# Patient Record
Sex: FEMALE | Race: WHITE | URBAN - METROPOLITAN AREA
[De-identification: names, ages, dates, MRNs, and addresses within clinical notes are randomized per-mention and may not be internally consistent; named-entity substitution may affect disease eponyms.]

---

## 2021-06-22 ENCOUNTER — OFFICE VISIT (OUTPATIENT)
Dept: FAMILY MEDICINE | Facility: CLINIC | Age: 36
End: 2021-06-22
Payer: COMMERCIAL

## 2021-06-22 VITALS
WEIGHT: 153 LBS | RESPIRATION RATE: 18 BRPM | HEIGHT: 63 IN | DIASTOLIC BLOOD PRESSURE: 82 MMHG | TEMPERATURE: 98.7 F | BODY MASS INDEX: 27.11 KG/M2 | SYSTOLIC BLOOD PRESSURE: 120 MMHG | HEART RATE: 70 BPM

## 2021-06-22 DIAGNOSIS — K21.9 GASTROESOPHAGEAL REFLUX DISEASE, UNSPECIFIED WHETHER ESOPHAGITIS PRESENT: ICD-10-CM

## 2021-06-22 DIAGNOSIS — Z72.0 TOBACCO ABUSE: ICD-10-CM

## 2021-06-22 DIAGNOSIS — R49.0 VOICE HOARSENESS: Primary | ICD-10-CM

## 2021-06-22 PROCEDURE — 99203 OFFICE O/P NEW LOW 30 MIN: CPT | Performed by: FAMILY MEDICINE

## 2021-06-22 RX ORDER — PANTOPRAZOLE SODIUM 40 MG/1
40 TABLET, DELAYED RELEASE ORAL DAILY
Qty: 30 TABLET | Refills: 1 | Status: SHIPPED | OUTPATIENT
Start: 2021-06-22 | End: 2021-08-25

## 2021-06-22 ASSESSMENT — MIFFLIN-ST. JEOR: SCORE: 1353.13

## 2021-06-22 NOTE — PATIENT INSTRUCTIONS
Patient Education   Resources to Help You Quit Smoking  If you have quit smoking or are thinking about quitting, congratulations! It can be hard to quit smoking, but the benefits are well worth it. To help you quit and stay smoke-free, there are many resources that can help.  Your health plan  If you have health insurance, call them for more details about their phone coaching programs.    [facility]:  7-858-630-BLUE ([phone])    [facility]:  6-977-697-QUIT ([phone])    [facility]:  9-906-412-BLUE ([phone])    [facility]:  [phone]    [facility]:  [phone]    [facility] members:  [phone]    [facility]:   [phone]    [facility]:  [phone]    [facility]:  [phone]  Other resources  American Cancer Society: 1-468.778.6893  The American Cancer Society can help you find local resources to quit smoking.  QUITPLAN: 1-997-745-PLAN (1-937.645.8643)  Offers a telephone helpline, gum, patches and lozenges. These services are free for the uninsured and those without coverage. The online program is free to everyone at www.quitplan.com.  American Lung Association: 6-879-LUNG-USA (1-872.340.3237)  Provides a lung helpline as well as an online program, self-help book and group clinic support for quitting smoking. www.lung.org/stop-smoking  National Cancer [place]:  8-088-587-QUIT (1-952.536.4325)  Offers a telephone hotline, online text chat and a website with tools, information and support for smokers who want to quit. www.smokefree.gov  Medication Therapy Management (MTM):  [phone] (toll free)  [email or web address]  This is a clinic program to help you quit smoking. It offers one-on-one sessions with a pharmacist. Call or email to find out if your insurance covers MTM and to schedule an appointment at all locations.  For informational purposes only.  Not to replace the advice of your health care provider. Copyright   2013 [facility]. All rights reserved. Clinically reviewed by Mira Lawson MD, [facility] Lung Cancer Screening Program. Care[employer] [id number] - Rev 06/19.       Patient Education     GERD (Adult)    The esophagus is a tube that carries food from the mouth to the stomach. A valve (the LES, lower esophageal sphincter) at the lower end of the esophagus prevents stomach acid from flowing upward. When this valve doesn't work properly, stomach contents may repeatedly flow back up (reflux) into the esophagus. This is called gastroesophageal reflux disease (GERD). GERD can irritate the esophagus. It can cause problems with pain, swallowing or breathing. In severe cases, GERD can cause recurrent pneumonia (from aspiration or breathing in particles) or other serious problems.  Symptoms of reflux include burning, pressure or sharp pain in the upper abdomen or mid to lower chest. The pain can spread to the neck, back, or shoulder. There may be belching, an acid taste in the back of the throat, chronic cough, or sore throat, or hoarseness. GERD symptoms often occur during the day after a big meal. They can also occur at night when lying down.   Home care  Lifestyle changes can help reduce symptoms. If needed, your healthcare provider may prescribe medicines. Symptoms often improve with treatment, but if treatment is stopped, the symptoms often return after a few months. So most persons with GERD will need to continue treatment or get treatment on and off.  Lifestyle changes    Limit or avoid fatty, fried, and spicy foods, as well as coffee, chocolate, mint, and foods with high acid content such as tomatoes and citrus fruit and juices (orange, grapefruit, lemon).    Don t eat large meals, especially at night. Frequent, smaller meals are best. Don't lie down right after eating. And don t eat anything 3 hours before going to  "bed.    Don't drink alcohol or smoke. As much as possible, stay away from second hand smoke.    If you are overweight, losing weight will reduce symptoms.     Don't wear tight clothing around your stomach area.    If your symptoms occur during sleep, use a foam wedge to elevate your upper body (not just your head.) Or, place 4\" blocks under the head of your bed. Or use 2 bed risers under your bedframe.  Medicines  If needed, medicines can help relieve the symptoms of GERD and prevent damage to the esophagus. Discuss a medicine plan with your healthcare provider. This may include one or more of the following medicines:    Antacids to help neutralize the normal acids in your stomach.    Acid blockers (Histamine or H2 blockers) to decrease acid production.    Acid inhibitors (proton pump inhibitors PPIs) to decrease acid production in a different way than the blockers. They may work better, but can take a little longer to take effect.  Take an antacid 30 to 60 minutes after eating and at bedtime, but not at the same time as an acid blocker.  Try not to take medicines such as ibuprofen and aspirin. If you are taking aspirin for your heart or other medical reasons, talk to your healthcare provider about stopping it.  Follow-up care  Follow up with your healthcare provider or as advised by our staff.  When to seek medical advice  Call your healthcare provider if any of the following occur:    Stomach pain gets worse or moves to the lower right abdomen (appendix area)    Chest pain appears or gets worse, or spreads to the back, neck, shoulder, or arm    An over-the-counter trial of medicine doesn't relieve your symptoms    Weight loss that can't be explained    Trouble or pain swallowing    Frequent vomiting (can t keep down liquids)    Blood in the stool or vomit (red or black in color)    Feeling weak or dizzy    Fever of 100.4 F (38 C) or higher, or as directed by your healthcare provider  Anna last reviewed this " educational content on 3/1/2018    6343-8511 [employer]. All rights reserved. This information is not intended as a substitute for professional medical care. Always follow your healthcare professional's instructions.

## 2021-06-22 NOTE — PROGRESS NOTES
"  Assessment & Plan     Voice hoarseness  Differentials discussed in detail including gastroesophageal reflux disease.  Protonix prescribed, common side effect discussed.  Stressed on smoking cessation.  ENT referral placed considering chronicity of symptoms and history of smoking.  Follow-up in 4 weeks or earlier if needed  - OTOLARYNGOLOGY REFERRAL    Tobacco abuse  Smoking about half a pack of cigarette per day, associated health hazards explained in detail, unfortunately not ready to quit currently    Gastroesophageal reflux disease, unspecified whether esophagitis present  As above, dietary counseling and written information provided  - pantoprazole (PROTONIX) 40 MG EC tablet; Take 1 tablet (40 mg) by mouth daily       Tobacco Cessation:   reports that she has been smoking cigarettes. She has been smoking about 0.50 packs per day. She has never used smokeless tobacco.  Tobacco Cessation Action Plan: Information offered: Patient not interested at this time    BMI:   Estimated body mass index is 27.1 kg/m  as calculated from the following:    Height as of this encounter: 1.6 m (5' 3\").    Weight as of this encounter: 69.4 kg (153 lb).   Weight management plan: Discussed healthy diet and exercise guidelines    Patient Instructions     Patient Education   Resources to Help You Quit Smoking  If you have quit smoking or are thinking about quitting, congratulations! It can be hard to quit smoking, but the benefits are well worth it. To help you quit and stay smoke-free, there are many resources that can help.  Your health plan  If you have health insurance, call them for more details about their phone coaching programs.    [facility]:  0-379-002-BLUE ([phone])    [facility]:  4-550-978-QUIT ([phone])    [facility]:  7-408-791-BLUE ([phone])    [facility]:  [phone]    [facility]:  [phone]    [facility] " members:  [phone]    [facility]:   [phone]    [facility]:  [phone]    [facility]:  [phone]  Other resources  American Cancer Society: 1-581.115.4555  The American Cancer Society can help you find local resources to quit smoking.  QUITPLAN: 5-837-544-PLAN (1-422.525.4602)  Offers a telephone helpline, gum, patches and lozenges. These services are free for the uninsured and those without coverage. The online program is free to everyone at www.[employer]Zuz.com.  American Lung Association: 1-800-LUNG-USA (1-704.648.3387)  Provides a lung helpline as well as an online program, self-help book and group clinic support for quitting smoking. www.lung.org/stop-smoking  National Cancer [place]:  6-122-697-QUIT (1-126.969.6370)  Offers a telephone hotline, online text chat and a website with tools, information and support for smokers who want to quit. www.smokefree.gov  Medication Therapy Management (MTM):  [phone]  [phone] (toll free)  [email or web address]  This is a clinic program to help you quit smoking. It offers one-on-one sessions with a pharmacist. Call or email to find out if your insurance covers MTM and to schedule an appointment at all locations.  For informational purposes only. Not to replace the advice of your health care provider. Copyright   2013 [facility]. All rights reserved. Clinically reviewed by Mira Lawson MD, [facility] Lung Cancer Screening Program. [employer] [id number] - Rev 06/19.       Patient Education     GERD (Adult)    The esophagus is a tube that carries food from the mouth to the stomach. A valve (the LES, lower esophageal sphincter) at the lower end of the esophagus prevents stomach acid from flowing upward. When this valve doesn't work properly, stomach contents may repeatedly flow back up (reflux) into the esophagus. This is called gastroesophageal reflux disease (GERD). GERD can  "irritate the esophagus. It can cause problems with pain, swallowing or breathing. In severe cases, GERD can cause recurrent pneumonia (from aspiration or breathing in particles) or other serious problems.  Symptoms of reflux include burning, pressure or sharp pain in the upper abdomen or mid to lower chest. The pain can spread to the neck, back, or shoulder. There may be belching, an acid taste in the back of the throat, chronic cough, or sore throat, or hoarseness. GERD symptoms often occur during the day after a big meal. They can also occur at night when lying down.   Home care  Lifestyle changes can help reduce symptoms. If needed, your healthcare provider may prescribe medicines. Symptoms often improve with treatment, but if treatment is stopped, the symptoms often return after a few months. So most persons with GERD will need to continue treatment or get treatment on and off.  Lifestyle changes    Limit or avoid fatty, fried, and spicy foods, as well as coffee, chocolate, mint, and foods with high acid content such as tomatoes and citrus fruit and juices (orange, grapefruit, lemon).    Don t eat large meals, especially at night. Frequent, smaller meals are best. Don't lie down right after eating. And don t eat anything 3 hours before going to bed.    Don't drink alcohol or smoke. As much as possible, stay away from second hand smoke.    If you are overweight, losing weight will reduce symptoms.     Don't wear tight clothing around your stomach area.    If your symptoms occur during sleep, use a foam wedge to elevate your upper body (not just your head.) Or, place 4\" blocks under the head of your bed. Or use 2 bed risers under your bedframe.  Medicines  If needed, medicines can help relieve the symptoms of GERD and prevent damage to the esophagus. Discuss a medicine plan with your healthcare provider. This may include one or more of the following medicines:    Antacids to help neutralize the normal acids in " your stomach.    Acid blockers (Histamine or H2 blockers) to decrease acid production.    Acid inhibitors (proton pump inhibitors PPIs) to decrease acid production in a different way than the blockers. They may work better, but can take a little longer to take effect.  Take an antacid 30 to 60 minutes after eating and at bedtime, but not at the same time as an acid blocker.  Try not to take medicines such as ibuprofen and aspirin. If you are taking aspirin for your heart or other medical reasons, talk to your healthcare provider about stopping it.  Follow-up care  Follow up with your healthcare provider or as advised by our staff.  When to seek medical advice  Call your healthcare provider if any of the following occur:    Stomach pain gets worse or moves to the lower right abdomen (appendix area)    Chest pain appears or gets worse, or spreads to the back, neck, shoulder, or arm    An over-the-counter trial of medicine doesn't relieve your symptoms    Weight loss that can't be explained    Trouble or pain swallowing    Frequent vomiting (can t keep down liquids)    Blood in the stool or vomit (red or black in color)    Feeling weak or dizzy    Fever of 100.4 F (38 C) or higher, or as directed by your healthcare provider  [employer] last reviewed this educational content on 3/1/2018    5656-6591 [employer]. All rights reserved. This information is not intended as a substitute for professional medical care. Always follow your healthcare professional's instructions.               Return in about 4 weeks (around 7/20/2021).    Cb Dougherty MD  [facility]    Randall Zamora is a 36 year old who presents for the following health issues     HPI     Concern - Hoarse Voice   Onset: Really long time- over a year off and on- was told to get checked for polyps   Description: Hoarse voice- starting to be more frequent   Intensity: moderate  Progression of Symptoms:   "worsening  Accompanying Signs & Symptoms: pain at times- if she raises her voice any then she will get spots in her throat that are sore.  Has bad acid reflux, if she coughs she will throw up. Is a current smoker- about half a pack a day   Previous history of similar problem: none-  Mom had throat cancer- not sure what type though  Therapies tried and outcome:  none       Review of Systems   Constitutional, HEENT, cardiovascular, pulmonary, gi and gu systems are negative, except as otherwise noted.      Objective    /82 (Cuff Size: Adult Regular)   Pulse 70   Temp 98.7  F (37.1  C) (Tympanic)   Resp 18   Ht 1.6 m (5' 3\")   Wt 69.4 kg (153 lb)   LMP  (LMP Unknown)   Breastfeeding No   BMI 27.10 kg/m    Body mass index is 27.1 kg/m .  Physical Exam   GENERAL: alert and no distress  EYES: Eyes grossly normal to inspection, PERRL and conjunctivae and sclerae normal  HENT: ear canals and TM's normal, nose and mouth without ulcers or lesions  NECK: no adenopathy, no asymmetry, masses, or scars and thyroid normal to palpation  RESP: lungs clear to auscultation - no rales, rhonchi or wheezes  CV: regular rate and rhythm, normal S1 S2, no S3 or S4, no murmur, click or rub, no peripheral edema and peripheral pulses strong  ABDOMEN: soft, nontender, no hepatosplenomegaly, no masses and bowel sounds normal  MS: no gross musculoskeletal defects noted, no edema  SKIN: no suspicious lesions or rashes  NEURO: Normal strength and tone, mentation intact and speech normal        "

## 2021-06-22 NOTE — NURSING NOTE
"Chief Complaint   Patient presents with     Hoarse     /82 (Cuff Size: Adult Regular)   Pulse 70   Temp 98.7  F (37.1  C) (Tympanic)   Resp 18   Ht 1.6 m (5' 3\")   Wt 69.4 kg (153 lb)   LMP  (LMP Unknown)   Breastfeeding No   BMI 27.10 kg/m   Estimated body mass index is 27.1 kg/m  as calculated from the following:    Height as of this encounter: 1.6 m (5' 3\").    Weight as of this encounter: 69.4 kg (153 lb).  Patient presents to the clinic using No DME      Health Maintenance that is potentially due pending provider review:    There are no preventive care reminders to display for this patient.             "

## 2021-07-09 NOTE — PROGRESS NOTES
Chief Complaint   Patient presents with     Throat Problem     c/o hoarseness and c/o pain in throat for over 2 years at intervals- daily now for last month     History of Present Illness  Sera Oshea is a 36 year old female who presents today for evaluation.  I am seeing this patient in consultation for voice hoarseness at the request of the provider Dr. Dougherty.  The patient reports a history of hoarseness for the past several years.  She feels like is been getting worse over the past few months.  She almost has daily symptoms.  She does have some intermittent trouble swallowing but no significant odynophagia.  No persistent pharyngodynia, otalgia, hemoptysis.  She sometimes will feel like she has some neck fullness or discomfort but no persistent lymphadenopathy or masses.  No unintentional weight loss, she lost a bit of weight recently on the keto diet.  She is a current smoker, roughly 1/2 pack/day.  The patient has a 15-20 pack year smoking history. The patient notes a significant history of symptomatic reflux several times a week.  She was recently started on pantoprazole with good benefit.  She usually takes this in the morning, she does not oftentimes have breakfast.  She usually has dinner between 7 and 9 PM and goes to bed between 9 and 11 PM.  He does not use any inhalers.     Past Medical History  There is no problem list on file for this patient.    Current Medications     Current Outpatient Medications:      nicotine (NICODERM CQ) 14 MG/24HR 24 hr patch, Place 1 patch onto the skin every 24 hours, Disp: 30 patch, Rfl: 1     nicotine (NICOTROL) 10 MG inhaler, Use 1 cartridge as needed for urge to smoke by puffing over course of 20min.  Use 6-16 cart/day; reduce number of cart/day over 6-12 weeks., Disp: 168 each, Rfl: 1     pantoprazole (PROTONIX) 40 MG EC tablet, Take 1 tablet (40 mg) by mouth daily, Disp: 30 tablet, Rfl: 1    Allergies  No Known Allergies    Social History   Social History      Socioeconomic History     Marital status:      Spouse name: Not on file     Number of children: Not on file     Years of education: Not on file     Highest education level: Not on file   Occupational History     Not on file   Social Needs     Financial resource strain: Not on file     Food insecurity     Worry: Not on file     Inability: Not on file     Transportation needs     Medical: Not on file     Non-medical: Not on file   Tobacco Use     Smoking status: Current Every Day Smoker     Packs/day: 0.50     Types: Cigarettes     Smokeless tobacco: Never Used   Substance and Sexual Activity     Alcohol use: Yes     Drug use: No     Sexual activity: Not on file   Lifestyle     Physical activity     Days per week: Not on file     Minutes per session: Not on file     Stress: Not on file   Relationships     Social connections     Talks on phone: Not on file     Gets together: Not on file     Attends Yazidism service: Not on file     Active member of club or organization: Not on file     Attends meetings of clubs or organizations: Not on file     Relationship status: Not on file     Intimate partner violence     Fear of current or ex partner: Not on file     Emotionally abused: Not on file     Physically abused: Not on file     Forced sexual activity: Not on file   Other Topics Concern     Not on file   Social History Narrative     Not on file       Family History  Family History   Problem Relation Age of Onset     Emphysema Mother      Throat cancer Mother 63       Review of Systems  As per HPI and PMHx, otherwise 10+ comprehensive system review is negative.    Physical Exam  /75 (BP Location: Right arm, Patient Position: Chair, Cuff Size: Adult Regular)   Pulse 90   Temp 98.8  F (37.1  C) (Tympanic)   Wt 69.4 kg (153 lb)   LMP  (LMP Unknown)   BMI 27.10 kg/m    GENERAL: Patient is a pleasant, cooperative 36 year old female in no acute distress.  HEAD: Normocephalic, atraumatic.  Hair and scalp  are normal.  EYES: Pupils are equal, round, reactive to light and accommodation.  Extraocular movements are intact.  The sclera nonicteric without injection.  The extraocular structures are normal.  EARS: Normal shape and symmetry.  No tenderness when palpating the mastoid or tragal areas bilaterally.  Otoscopic exam reveals a minimal amount of cerumen bilaterally.  The bilateral tympanic membranes are round, intact without evidence of effusion, good landmarks.  No retraction, granulation, or drainage.  NOSE: Nares are patent.  Nasal mucosa is pink and moist. Negative anterior rhinoscpy.  ORAL CAVITY: Dentition is in good repair.  Mucous membranes are moist.  Tongue is mobile, protrudes to the midline.  Palate elevates symmetrically.  Tonsils are 1+, symmetric.  No erythema or exudate.  No oral cavity or oropharyngeal masses, lesions, ulcerations, leukoplakia.  NECK: Supple, trachea is midline.  There no palpable cervical lymphadenopathy or masses bilaterally.  Palpation of the bilateral parotid and submandibular areas reveal no masses.  No thyromegaly.    NEUROLOGIC: Cranial nerves II through XII are grossly intact.  The patients voice is hoarse and raspy.  Patient is House-Brackmann I/VI bilaterally.  CARDIOVASCULAR: Extremities are warm and well-perfused.  No significant peripheral edema.  RESPIRATORY: Patient has nonlabored breathing without cough, wheeze, stridor.  PSYCHIATRIC: Patient is alert and oriented.  Mood and affect appear normal.  SKIN: Warm and dry.  No scalp, face, or neck lesions noted.    Procedure: Flexible Laryngoscopy  Indication: Dysphonia    To best visualize the upper airway anatomy and due to the chief complaint and HPI, I proceeded with flexible fiberoptic laryngoscopy examination.  The bilateral nasal cavities were anesthetized and decongested with a mixture of lidocaine and neosynephrine.  The bilateral nasal cavities were examined using a flexible fiberoptic laryngoscope.  There were no  nasal cavity masses, polyps, or mucopurulence bilaterally.  The nasopharynx had a normal appearance with normal Eustachian tube openings and fossa of Rosenmuller bilaterally. Minimal adenoid tissue.  The base of tongue, vallecula, epiglottis, aryepiglottic folds, arytenoids, and piriform sinuses were without mass or lesion.  The bilateral true vocal folds were symmetrically mobile without masses.  Patient does have appearance of small bilateral vocal fold nodules that lead to an anterior and posterior gap during phonation.  She also has some supraglottic squeeze/hyperfunction with phonation.  The visualized portions of the infraglottic and subglottic airway are unremarkable.  The scope was removed.  The patient tolerated the procedure well.            Assessment and Plan    ICD-10-CM    1. Muscle tension dysphonia  R49.0 LARYNGOSCOPY FLEX FIBEROPTIC, DIAGNOSTIC     Speech Therapy Referral     nicotine (NICODERM CQ) 14 MG/24HR 24 hr patch     nicotine (NICOTROL) 10 MG inhaler     Adult Gastro Ref - Procedure Only   2. Vocal cord nodules  J38.2 LARYNGOSCOPY FLEX FIBEROPTIC, DIAGNOSTIC     Speech Therapy Referral     nicotine (NICODERM CQ) 14 MG/24HR 24 hr patch     nicotine (NICOTROL) 10 MG inhaler     Adult Gastro Ref - Procedure Only   3. Laryngopharyngeal reflux  K21.9 nicotine (NICODERM CQ) 14 MG/24HR 24 hr patch     nicotine (NICOTROL) 10 MG inhaler     Adult Gastro Ref - Procedure Only   4. Tobacco dependence syndrome  F17.200 LARYNGOSCOPY FLEX FIBEROPTIC, DIAGNOSTIC     Speech Therapy Referral     nicotine (NICODERM CQ) 14 MG/24HR 24 hr patch     nicotine (NICOTROL) 10 MG inhaler     Adult Gastro Ref - Procedure Only   5. Encounter for tobacco use cessation counseling  Z71.6 LARYNGOSCOPY FLEX FIBEROPTIC, DIAGNOSTIC     Speech Therapy Referral     nicotine (NICODERM CQ) 14 MG/24HR 24 hr patch     nicotine (NICOTROL) 10 MG inhaler     Adult Gastro Ref - Procedure Only      It was my pleasure seeing Sera SEWELL  Dorie today in clinic.  The patient presented today with intermittent dysphonia that is been more persistent over the past several months.  Her endoscopic exam is consistent with some muscle tension dysphonia and bilateral anterior true vocal fold nodules.  She has no significant signs of  infection, inflammation, or neoplasm on exam to explain the symptoms.     Given the patient's significant history of gastroesophageal reflux, I think there is also a large component of laryngopharyngeal reflux.  Is been started on a PPI with good control of her reflux symptoms.  She does not take it every day as she is not always symptomatic.  We discussed taking the pantoprazole once daily 20-30 minutes prior to a meal.  We discussed not eating or drink anything significant 2 to 3 hours prior to bedtime.  I do think the patient would benefit from tobacco cessation.  I provided her with a prescription for nicotine patches and the Nicotrol inhaler.  The patient also would benefit from speech therapy evaluation for treatment of her muscle tension dysphonia and good vocal hygiene and voice use to treat her vocal nodules.  I placed a referral to speech therapy.  I also placed a referral for upper endoscopy to make sure she has no significant signs of reflux and/or hiatal hernia.  I would like to see the patient back in 6 weeks for follow-up.     Jaden Atkins MD  Department of Otolarygology-Head and Neck Surgery  [facility]

## 2021-07-12 ENCOUNTER — OFFICE VISIT (OUTPATIENT)
Dept: OTOLARYNGOLOGY | Facility: CLINIC | Age: 36
End: 2021-07-12
Attending: FAMILY MEDICINE
Payer: COMMERCIAL

## 2021-07-12 VITALS
DIASTOLIC BLOOD PRESSURE: 75 MMHG | HEART RATE: 90 BPM | BODY MASS INDEX: 27.1 KG/M2 | SYSTOLIC BLOOD PRESSURE: 120 MMHG | TEMPERATURE: 98.8 F | WEIGHT: 153 LBS

## 2021-07-12 DIAGNOSIS — F17.200 TOBACCO DEPENDENCE SYNDROME: ICD-10-CM

## 2021-07-12 DIAGNOSIS — K21.9 LARYNGOPHARYNGEAL REFLUX: ICD-10-CM

## 2021-07-12 DIAGNOSIS — Z71.6 ENCOUNTER FOR TOBACCO USE CESSATION COUNSELING: ICD-10-CM

## 2021-07-12 DIAGNOSIS — R49.0 MUSCLE TENSION DYSPHONIA: Primary | ICD-10-CM

## 2021-07-12 DIAGNOSIS — J38.2 VOCAL CORD NODULES: ICD-10-CM

## 2021-07-12 PROCEDURE — 31575 DIAGNOSTIC LARYNGOSCOPY: CPT | Performed by: OTOLARYNGOLOGY

## 2021-07-12 PROCEDURE — 99203 OFFICE O/P NEW LOW 30 MIN: CPT | Mod: 25 | Performed by: OTOLARYNGOLOGY

## 2021-07-12 RX ORDER — NICOTINE 21 MG/24HR
1 PATCH, TRANSDERMAL 24 HOURS TRANSDERMAL EVERY 24 HOURS
Qty: 30 PATCH | Refills: 1 | Status: SHIPPED | OUTPATIENT
Start: 2021-07-12 | End: 2021-08-25

## 2021-07-12 ASSESSMENT — PAIN SCALES - GENERAL: PAINLEVEL: MILD PAIN (2)

## 2021-07-12 NOTE — LETTER
7/12/2021         RE: Sera Oshea  [address] [address] [address]        Dear Colleague,    Thank you for referring your patient, Sera Oshea, to the [facility]. Please see a copy of my visit note below.    Chief Complaint   Patient presents with     Throat Problem     c/o hoarseness and c/o pain in throat for over 2 years at intervals- daily now for last month     History of Present Illness  Sera Oshea is a 36 year old female who presents today for evaluation.  I am seeing this patient in consultation for voice hoarseness at the request of the provider Dr. Dougherty.  The patient reports a history of hoarseness for the past several years.  She feels like is been getting worse over the past few months.  She almost has daily symptoms.  She does have some intermittent trouble swallowing but no significant odynophagia.  No persistent pharyngodynia, otalgia, hemoptysis.  She sometimes will feel like she has some neck fullness or discomfort but no persistent lymphadenopathy or masses.  No unintentional weight loss, she lost a bit of weight recently on the keto diet.  She is a current smoker, roughly 1/2 pack/day.  The patient has a 15-20 pack year smoking history. The patient notes a significant history of symptomatic reflux several times a week.  She was recently started on pantoprazole with good benefit.  She usually takes this in the morning, she does not oftentimes have breakfast.  She usually has dinner between 7 and 9 PM and goes to bed between 9 and 11 PM.  He does not use any inhalers.     Past Medical History  There is no problem list on file for this patient.    Current Medications     Current Outpatient Medications:      nicotine (NICODERM CQ) 14 MG/24HR 24 hr patch, Place 1 patch onto the skin every 24 hours, Disp: 30 patch, Rfl: 1     nicotine (NICOTROL) 10 MG inhaler, Use 1 cartridge as needed for urge to smoke by puffing over course of 20min.  Use 6-16  cart/day; reduce number of cart/day over 6-12 weeks., Disp: 168 each, Rfl: 1     pantoprazole (PROTONIX) 40 MG EC tablet, Take 1 tablet (40 mg) by mouth daily, Disp: 30 tablet, Rfl: 1    Allergies  No Known Allergies    Social History   Social History     Socioeconomic History     Marital status:      Spouse name: Not on file     Number of children: Not on file     Years of education: Not on file     Highest education level: Not on file   Occupational History     Not on file   Social Needs     Financial resource strain: Not on file     Food insecurity     Worry: Not on file     Inability: Not on file     Transportation needs     Medical: Not on file     Non-medical: Not on file   Tobacco Use     Smoking status: Current Every Day Smoker     Packs/day: 0.50     Types: Cigarettes     Smokeless tobacco: Never Used   Substance and Sexual Activity     Alcohol use: Yes     Drug use: No     Sexual activity: Not on file   Lifestyle     Physical activity     Days per week: Not on file     Minutes per session: Not on file     Stress: Not on file   Relationships     Social connections     Talks on phone: Not on file     Gets together: Not on file     Attends Mosque service: Not on file     Active member of club or organization: Not on file     Attends meetings of clubs or organizations: Not on file     Relationship status: Not on file     Intimate partner violence     Fear of current or ex partner: Not on file     Emotionally abused: Not on file     Physically abused: Not on file     Forced sexual activity: Not on file   Other Topics Concern     Not on file   Social History Narrative     Not on file       Family History  Family History   Problem Relation Age of Onset     Emphysema Mother      Throat cancer Mother 63       Review of Systems  As per HPI and PMHx, otherwise 10+ comprehensive system review is negative.    Physical Exam  /75 (BP Location: Right arm, Patient Position: Chair, Cuff Size: Adult Regular)    Pulse 90   Temp 98.8  F (37.1  C) (Tympanic)   Wt 69.4 kg (153 lb)   LMP  (LMP Unknown)   BMI 27.10 kg/m    GENERAL: Patient is a pleasant, cooperative 36 year old female in no acute distress.  HEAD: Normocephalic, atraumatic.  Hair and scalp are normal.  EYES: Pupils are equal, round, reactive to light and accommodation.  Extraocular movements are intact.  The sclera nonicteric without injection.  The extraocular structures are normal.  EARS: Normal shape and symmetry.  No tenderness when palpating the mastoid or tragal areas bilaterally.  Otoscopic exam reveals a minimal amount of cerumen bilaterally.  The bilateral tympanic membranes are round, intact without evidence of effusion, good landmarks.  No retraction, granulation, or drainage.  NOSE: Nares are patent.  Nasal mucosa is pink and moist. Negative anterior rhinoscpy.  ORAL CAVITY: Dentition is in good repair.  Mucous membranes are moist.  Tongue is mobile, protrudes to the midline.  Palate elevates symmetrically.  Tonsils are 1+, symmetric.  No erythema or exudate.  No oral cavity or oropharyngeal masses, lesions, ulcerations, leukoplakia.  NECK: Supple, trachea is midline.  There no palpable cervical lymphadenopathy or masses bilaterally.  Palpation of the bilateral parotid and submandibular areas reveal no masses.  No thyromegaly.    NEUROLOGIC: Cranial nerves II through XII are grossly intact.  The patients voice is hoarse and raspy.  Patient is House-Brackmann I/VI bilaterally.  CARDIOVASCULAR: Extremities are warm and well-perfused.  No significant peripheral edema.  RESPIRATORY: Patient has nonlabored breathing without cough, wheeze, stridor.  PSYCHIATRIC: Patient is alert and oriented.  Mood and affect appear normal.  SKIN: Warm and dry.  No scalp, face, or neck lesions noted.    Procedure: Flexible Laryngoscopy  Indication: Dysphonia    To best visualize the upper airway anatomy and due to the chief complaint and HPI, I proceeded with flexible  fiberoptic laryngoscopy examination.  The bilateral nasal cavities were anesthetized and decongested with a mixture of lidocaine and neosynephrine.  The bilateral nasal cavities were examined using a flexible fiberoptic laryngoscope.  There were no nasal cavity masses, polyps, or mucopurulence bilaterally.  The nasopharynx had a normal appearance with normal Eustachian tube openings and fossa of Rosenmuller bilaterally. Minimal adenoid tissue.  The base of tongue, vallecula, epiglottis, aryepiglottic folds, arytenoids, and piriform sinuses were without mass or lesion.  The bilateral true vocal folds were symmetrically mobile without masses.  Patient does have appearance of small bilateral vocal fold nodules that lead to an anterior and posterior gap during phonation.  She also has some supraglottic squeeze/hyperfunction with phonation.  The visualized portions of the infraglottic and subglottic airway are unremarkable.  The scope was removed.  The patient tolerated the procedure well.            Assessment and Plan    ICD-10-CM    1. Muscle tension dysphonia  R49.0 LARYNGOSCOPY FLEX FIBEROPTIC, DIAGNOSTIC     Speech Therapy Referral     nicotine (NICODERM CQ) 14 MG/24HR 24 hr patch     nicotine (NICOTROL) 10 MG inhaler     Adult Gastro Ref - Procedure Only   2. Vocal cord nodules  J38.2 LARYNGOSCOPY FLEX FIBEROPTIC, DIAGNOSTIC     Speech Therapy Referral     nicotine (NICODERM CQ) 14 MG/24HR 24 hr patch     nicotine (NICOTROL) 10 MG inhaler     Adult Gastro Ref - Procedure Only   3. Laryngopharyngeal reflux  K21.9 nicotine (NICODERM CQ) 14 MG/24HR 24 hr patch     nicotine (NICOTROL) 10 MG inhaler     Adult Gastro Ref - Procedure Only   4. Tobacco dependence syndrome  F17.200 LARYNGOSCOPY FLEX FIBEROPTIC, DIAGNOSTIC     Speech Therapy Referral     nicotine (NICODERM CQ) 14 MG/24HR 24 hr patch     nicotine (NICOTROL) 10 MG inhaler     Adult Gastro Ref - Procedure Only   5. Encounter for tobacco use cessation  counseling  Z71.6 LARYNGOSCOPY FLEX FIBEROPTIC, DIAGNOSTIC     Speech Therapy Referral     nicotine (NICODERM CQ) 14 MG/24HR 24 hr patch     nicotine (NICOTROL) 10 MG inhaler     Adult Gastro Ref - Procedure Only      It was my pleasure seeing Sera Oshea today in clinic.  The patient presented today with intermittent dysphonia that is been more persistent over the past several months.  Her endoscopic exam is consistent with some muscle tension dysphonia and bilateral anterior true vocal fold nodules.  She has no significant signs of  infection, inflammation, or neoplasm on exam to explain the symptoms.     Given the patient's significant history of gastroesophageal reflux, I think there is also a large component of laryngopharyngeal reflux.  Is been started on a PPI with good control of her reflux symptoms.  She does not take it every day as she is not always symptomatic.  We discussed taking the pantoprazole once daily 20-30 minutes prior to a meal.  We discussed not eating or drink anything significant 2 to 3 hours prior to bedtime.  I do think the patient would benefit from tobacco cessation.  I provided her with a prescription for nicotine patches and the Nicotrol inhaler.  The patient also would benefit from speech therapy evaluation for treatment of her muscle tension dysphonia and good vocal hygiene and voice use to treat her vocal nodules.  I placed a referral to speech therapy.  I also placed a referral for upper endoscopy to make sure she has no significant signs of reflux and/or hiatal hernia.  I would like to see the patient back in 6 weeks for follow-up.     Jaden Atkins MD  Department of Otolarygology-Head and Neck Surgery  [facility]         Again, thank you for allowing me to participate in the care of your patient.        Sincerely,        Jaden Atkins MD

## 2021-07-12 NOTE — NURSING NOTE
"Initial /75 (BP Location: Right arm, Patient Position: Chair, Cuff Size: Adult Regular)   Pulse 90   Temp 98.8  F (37.1  C) (Tympanic)   Wt 69.4 kg (153 lb)   LMP  (LMP Unknown)   BMI 27.10 kg/m   Estimated body mass index is 27.1 kg/m  as calculated from the following:    Height as of 6/22/21: 1.6 m (5' 3\").    Weight as of this encounter: 69.4 kg (153 lb). .    Blank Damon LPN    "

## 2021-07-13 DIAGNOSIS — Z11.59 ENCOUNTER FOR SCREENING FOR OTHER VIRAL DISEASES: ICD-10-CM

## 2021-07-14 ENCOUNTER — HOSPITAL ENCOUNTER (OUTPATIENT)
Dept: SPEECH THERAPY | Facility: CLINIC | Age: 36
Setting detail: THERAPIES SERIES
End: 2021-07-14
Attending: OTOLARYNGOLOGY
Payer: COMMERCIAL

## 2021-07-14 DIAGNOSIS — J38.2 VOCAL CORD NODULES: ICD-10-CM

## 2021-07-14 DIAGNOSIS — F17.200 TOBACCO DEPENDENCE SYNDROME: ICD-10-CM

## 2021-07-14 DIAGNOSIS — R49.0 MUSCLE TENSION DYSPHONIA: ICD-10-CM

## 2021-07-14 DIAGNOSIS — Z71.6 ENCOUNTER FOR TOBACCO USE CESSATION COUNSELING: ICD-10-CM

## 2021-07-14 PROCEDURE — 92524 BEHAVRAL QUALIT ANALYS VOICE: CPT | Mod: GN | Performed by: SPEECH-LANGUAGE PATHOLOGIST

## 2021-07-15 NOTE — PROGRESS NOTES
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 Reduced phonation time and atypical s/z ratio suggests vocal fold inefficiency. History of GERD and smoking.  Pt reports that her voice severely impacts her life. Treatment indicated to target vocal hygiene, muscle deactivation, and semi-occluded vocal tract exercises.    07/14/21   Voice Evaluation       Present No   General Information   Type of Evaluation Voice   Type Of Visit Initial   Start Of Care Date 07/14/21  (evaluation date)   Referring Physician Jaden Atkins MD   Orders Evaluate And Treat   Medical Diagnosis Muscle tension dysphonia   Onset Of Illness/injury Or Date Of Surgery 07/14/21  (evaluation date)   Precautions/Limitations  no known precautions/limitations   Hearing WFL   Surgical/Medical history reviewed Yes   Pertinent History Of Current Problem  The patient reports a history of hoarseness for the past several years.  She feels like is been getting worse over the past few months. In previous years, she experienced acute aphonia after high levels of vocal use (social events, singing karaoke, etc). Presently, the pt presents with chronic hoarseness. She reports pain in her throat after vocal use. Pt has a history of GERD and smokes .5 ppd. Recently completion of flexible laryngoscopy revealed small bilateral vocal fold nodules that lead to an anterior and posterior gap during phonation.  She also has some supraglottic squeeze/hyperfunction with phonation.   Prior Level Of Function Comment Pt reports she has always been a loud talker. She previously has had difficulty after high levels of vocal use (yelling, karaoke, etc) and would experience dysphonia and/or aphonia afterwards.    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 She reports drinking large amounts of water each day and stated she is working on quitting smoking.    Patient/family Goals Pt goal to reduce hoarse vocal quality.   Fall Risk Screen   Fall screen completed by SLP   Have you fallen 2 or more times in the past year? No   Have you fallen and had an injury in the past year? No   Is patient a fall risk? No   Abuse Screen (yes response referral indicated)   Feels Unsafe at Home or Work/School no   Feels Threatened by Someone no   Does Anyone Try to Keep You From Having Contact with Others or Doing Things Outside Your Home? no   Physical Signs of Abuse Present no   Pain Assessment   Pain Reported No   Speech   Deficits in Speech Respiration Clavicular   Deficits in Phonation Hoarse quality;Harsh quality;Breathy quality   Sustained vowel production 9.5   S/Z Ratio 2  (out of average range; inefficient air flow from VF)   Deficits in Articulation None   Speech Comments Pt presents with chronic hoarse, harsh, and breathy vocal quality. She demonstrates reduced sustained vowel phonation and atypical s/z ratio. Given the voice handicap index, the pt reports severe impact due to dysphonia.    General Therapy Interventions   Planned Therapy Interventions Voice   Voice Relaxed breath sequence techniques;Resonant voice techniques  (Laryngeal deactivation, vocal hygiene)   Intervention Comments Treatment 1x every two weeks for 8 weeks. Treatment goals to target vocal hygiene, deactivation, and semi-occluded vocal tract exercises.    Clinical Impression, SLP Eval   Criteria for Skilled Therapeutic Interventions Met (SLP Eval) yes;treatment indicated   SLP Diagnosis Moderate Dysphonia   Influenced by the following factors/impairments Vocal nodules found by flexible laryngoscopy   Functional limitations due to impairments Limited vocal use for functional and social situations   Rehab potential affected by Completion of home programming   Therapy Frequency 1  time;other (see comments)  (every two weeks)   Predicted Duration of Therapy Intervention (days/wks) 8 weeks   Risks and Benefits of Treatment have been explained. 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 Reduced phonation time and atypical s/z ratio suggests vocal fold inefficiency. History of GERD and smoking.  Pt reports that her voice severely impacts her life. Treatment indicated to target vocal hygiene, muscle deactivation, and semi-occluded vocal tract exercises.    Voice Goals   Voice Goal 1;2;3   Voice Goal 1   Goal Identifier Vocal hygiene   Goal Description Pt will report use of vocal hygiene strategies (vocal rest, hydration, GERD management, etc) 6 out of 7 days of the week.   Target Date 09/13/21   Voice Goal 2   Goal Identifier Deactivation   Goal Description Pt will demonstrate understanding of deactivation stretches and laryngeal massage with 80% accuracy in the session.    Target Date 09/13/21   Voice Goal 3   Goal Identifier SOVT   Goal Description Pt will demonstrate use of semi-occluded vocal tract exercises with 80% accuracy in the session.    Target Date 09/13/21   Total Session Time   Voice Minutes (97172) 40   Total Evaluation Time 40

## 2021-07-26 ENCOUNTER — LAB (OUTPATIENT)
Dept: LAB | Facility: CLINIC | Age: 36
End: 2021-07-26
Payer: COMMERCIAL

## 2021-07-26 DIAGNOSIS — Z11.59 ENCOUNTER FOR SCREENING FOR OTHER VIRAL DISEASES: ICD-10-CM

## 2021-07-26 PROCEDURE — U0005 INFEC AGEN DETEC AMPLI PROBE: HCPCS

## 2021-07-26 PROCEDURE — U0003 INFECTIOUS AGENT DETECTION BY NUCLEIC ACID (DNA OR RNA); SEVERE ACUTE RESPIRATORY SYNDROME CORONAVIRUS 2 (SARS-COV-2) (CORONAVIRUS DISEASE [COVID-19]), AMPLIFIED PROBE TECHNIQUE, MAKING USE OF HIGH THROUGHPUT TECHNOLOGIES AS DESCRIBED BY CMS-2020-01-R: HCPCS

## 2021-07-27 ENCOUNTER — ANESTHESIA EVENT (OUTPATIENT)
Dept: GASTROENTEROLOGY | Facility: CLINIC | Age: 36
End: 2021-07-27
Payer: COMMERCIAL

## 2021-07-27 LAB — SARS-COV-2 RNA RESP QL NAA+PROBE: NEGATIVE

## 2021-07-27 ASSESSMENT — LIFESTYLE VARIABLES: TOBACCO_USE: 1

## 2021-07-27 NOTE — ANESTHESIA PREPROCEDURE EVALUATION
Anesthesia Pre-Procedure Evaluation    Patient: Sera Oshea   MRN: [id number] : 1985        Preoperative Diagnosis: Muscle tension dysphonia [R49.0]  Vocal cord nodule [J38.2]  Tobacco dependence syndrome [F17.200]  Laryngopharyngeal reflux (LPR) [K21.9]  Tobacco abuse counseling [Z71.6]   Procedure : Procedure(s):  ESOPHAGOGASTRODUODENOSCOPY (EGD)     No past medical history on file.   History reviewed. No pertinent surgical history.   No Known Allergies   Social History     Tobacco Use     Smoking status: Current Every Day Smoker     Packs/day: 0.50     Types: Cigarettes     Smokeless tobacco: Never Used   Substance Use Topics     Alcohol use: Yes     Comment: occas      Wt Readings from Last 1 Encounters:   21 69.4 kg (153 lb)        Anesthesia Evaluation   Pt has had prior anesthetic. Type: General.    No history of anesthetic complications       ROS/MED HX  ENT/Pulmonary:     (+) tobacco use, Current use, 1 packs/day,     Neurologic:  - neg neurologic ROS     Cardiovascular:  - neg cardiovascular ROS     METS/Exercise Tolerance: >4 METS    Hematologic:  - neg hematologic  ROS     Musculoskeletal:  - neg musculoskeletal ROS     GI/Hepatic:  - neg GI/hepatic ROS     Renal/Genitourinary:  - neg Renal ROS     Endo:  - neg endo ROS     Psychiatric/Substance Use:     (+) Recreational drug usage: Cannabis.    Infectious Disease:  - neg infectious disease ROS     Malignancy:  - neg malignancy ROS     Other:  - neg other ROS          Physical Exam    Airway        Mallampati: I   TM distance: > 3 FB   Neck ROM: full   Mouth opening: > 3 cm    Respiratory Devices and Support         Dental  no notable dental history         Cardiovascular   cardiovascular exam normal          Pulmonary   pulmonary exam normal                OUTSIDE LABS:  CBC: No results found for: WBC, HGB, HCT, PLT  BMP: No results found for: NA, POTASSIUM, CHLORIDE, CO2, BUN, CR, GLC  COAGS: No results found for: PTT, INR,  FIBR  POC: No results found for: BGM, HCG, HCGS  HEPATIC: No results found for: ALBUMIN, PROTTOTAL, ALT, AST, GGT, ALKPHOS, BILITOTAL, BILIDIRECT, AKTHE  OTHER: No results found for: PH, LACT, A1C, AIDA, PHOS, MAG, LIPASE, AMYLASE, TSH, T4, T3, CRP, SED    Anesthesia Plan    ASA Status:  2      Anesthesia Type: General.     - Airway: Native airway   Induction: Intravenous, Propofol.   Maintenance: TIVA.        Consents    Anesthesia Plan(s) and associated risks, benefits, and realistic alternatives discussed. Questions answered and patient/representative(s) expressed understanding.     - Discussed with:  Patient         Postoperative Care            Comments:                NOEMÍ Hwang CRNA

## 2021-07-28 ENCOUNTER — ANESTHESIA (OUTPATIENT)
Dept: GASTROENTEROLOGY | Facility: CLINIC | Age: 36
End: 2021-07-28
Payer: COMMERCIAL

## 2021-07-28 ENCOUNTER — HOSPITAL ENCOUNTER (OUTPATIENT)
Facility: CLINIC | Age: 36
Discharge: HOME | End: 2021-07-28
Attending: SURGERY | Admitting: SURGERY
Payer: COMMERCIAL

## 2021-07-28 VITALS
BODY MASS INDEX: 28.89 KG/M2 | RESPIRATION RATE: 20 BRPM | WEIGHT: 153 LBS | OXYGEN SATURATION: 100 % | DIASTOLIC BLOOD PRESSURE: 83 MMHG | SYSTOLIC BLOOD PRESSURE: 124 MMHG | HEART RATE: 64 BPM | TEMPERATURE: 99.3 F | HEIGHT: 61 IN

## 2021-07-28 LAB — UPPER GI ENDOSCOPY: NORMAL

## 2021-07-28 PROCEDURE — 250N000009 HC RX 250: Performed by: SURGERY

## 2021-07-28 PROCEDURE — 88305 TISSUE EXAM BY PATHOLOGIST: CPT | Mod: TC | Performed by: SURGERY

## 2021-07-28 PROCEDURE — 370N000017 HC ANESTHESIA TECHNICAL FEE, PER MIN: Performed by: SURGERY

## 2021-07-28 PROCEDURE — 258N000003 HC RX IP 258 OP 636: Performed by: NURSE ANESTHETIST, CERTIFIED REGISTERED

## 2021-07-28 PROCEDURE — 43239 EGD BIOPSY SINGLE/MULTIPLE: CPT | Performed by: SURGERY

## 2021-07-28 PROCEDURE — 250N000011 HC RX IP 250 OP 636: Performed by: NURSE ANESTHETIST, CERTIFIED REGISTERED

## 2021-07-28 PROCEDURE — 250N000009 HC RX 250: Performed by: NURSE ANESTHETIST, CERTIFIED REGISTERED

## 2021-07-28 RX ORDER — NALOXONE HYDROCHLORIDE 0.4 MG/ML
0.2 INJECTION, SOLUTION INTRAMUSCULAR; INTRAVENOUS; SUBCUTANEOUS
Status: DISCONTINUED | OUTPATIENT
Start: 2021-07-28 | End: 2021-07-28 | Stop reason: HOSPADM

## 2021-07-28 RX ORDER — NALOXONE HYDROCHLORIDE 0.4 MG/ML
0.4 INJECTION, SOLUTION INTRAMUSCULAR; INTRAVENOUS; SUBCUTANEOUS
Status: DISCONTINUED | OUTPATIENT
Start: 2021-07-28 | End: 2021-07-28 | Stop reason: HOSPADM

## 2021-07-28 RX ORDER — SODIUM CHLORIDE, SODIUM LACTATE, POTASSIUM CHLORIDE, CALCIUM CHLORIDE 600; 310; 30; 20 MG/100ML; MG/100ML; MG/100ML; MG/100ML
INJECTION, SOLUTION INTRAVENOUS CONTINUOUS
Status: DISCONTINUED | OUTPATIENT
Start: 2021-07-28 | End: 2021-07-28 | Stop reason: HOSPADM

## 2021-07-28 RX ORDER — PROPOFOL 10 MG/ML
INJECTION, EMULSION INTRAVENOUS PRN
Status: DISCONTINUED | OUTPATIENT
Start: 2021-07-28 | End: 2021-07-28

## 2021-07-28 RX ORDER — LIDOCAINE 40 MG/G
CREAM TOPICAL
Status: DISCONTINUED | OUTPATIENT
Start: 2021-07-28 | End: 2021-07-28 | Stop reason: HOSPADM

## 2021-07-28 RX ORDER — FLUMAZENIL 0.1 MG/ML
0.2 INJECTION, SOLUTION INTRAVENOUS
Status: DISCONTINUED | OUTPATIENT
Start: 2021-07-28 | End: 2021-07-28 | Stop reason: HOSPADM

## 2021-07-28 RX ORDER — PROPOFOL 10 MG/ML
INJECTION, EMULSION INTRAVENOUS CONTINUOUS PRN
Status: DISCONTINUED | OUTPATIENT
Start: 2021-07-28 | End: 2021-07-28

## 2021-07-28 RX ORDER — ONDANSETRON 2 MG/ML
4 INJECTION INTRAMUSCULAR; INTRAVENOUS
Status: DISCONTINUED | OUTPATIENT
Start: 2021-07-28 | End: 2021-07-28 | Stop reason: HOSPADM

## 2021-07-28 RX ORDER — LIDOCAINE HYDROCHLORIDE 10 MG/ML
INJECTION, SOLUTION EPIDURAL; INFILTRATION; INTRACAUDAL; PERINEURAL PRN
Status: DISCONTINUED | OUTPATIENT
Start: 2021-07-28 | End: 2021-07-28

## 2021-07-28 RX ADMIN — LIDOCAINE HYDROCHLORIDE 0.1 ML: 10 INJECTION, SOLUTION EPIDURAL; INFILTRATION; INTRACAUDAL; PERINEURAL at 12:12

## 2021-07-28 RX ADMIN — LIDOCAINE HYDROCHLORIDE 50 MG: 10 INJECTION, SOLUTION EPIDURAL; INFILTRATION; INTRACAUDAL; PERINEURAL at 13:04

## 2021-07-28 RX ADMIN — TOPICAL ANESTHETIC 1 SPRAY: 200 SPRAY DENTAL; PERIODONTAL at 13:03

## 2021-07-28 RX ADMIN — PROPOFOL 200 MCG/KG/MIN: 10 INJECTION, EMULSION INTRAVENOUS at 13:04

## 2021-07-28 RX ADMIN — PROPOFOL 100 MG: 10 INJECTION, EMULSION INTRAVENOUS at 13:04

## 2021-07-28 RX ADMIN — SODIUM CHLORIDE, POTASSIUM CHLORIDE, SODIUM LACTATE AND CALCIUM CHLORIDE: 600; 310; 30; 20 INJECTION, SOLUTION INTRAVENOUS at 12:12

## 2021-07-28 ASSESSMENT — MIFFLIN-ST. JEOR: SCORE: 1321.38

## 2021-07-28 NOTE — H&P
36 year old year old female here for upper endoscopy for reflux and dysphonia.  She has long standing history of reflux. She was seen by ENT for dysphonia which was thought to be related to reflux.  She is on Pantoprazole.  Some improvement.  No dysphagia        There is no problem list on file for this patient.      History reviewed. No pertinent past medical history.    History reviewed. No pertinent surgical history.    Family History   Problem Relation Age of Onset     Emphysema Mother      Throat cancer Mother 63       No current outpatient medications on file.       No Known Allergies    Pt reports that she has been smoking cigarettes. She has been smoking about 0.50 packs per day. She has never used smokeless tobacco. She reports current alcohol use. She reports current drug use. Drug: Marijuana.    Exam:    Awake, Alert OX3  Lungs - CTA bilaterally  CV - RRR, no murmurs, distal pulses intact  Abd - soft, non-distended, non-tender, +BS  Extr - No cyanosis or edema    A/P 36 year old year old female in need of upper endoscopy for reflux, dysphonia. Risks, benefits, alternatives, and complications were discussed including the possibility of perforation and the patient agreed to proceed.    Julio Davis, DO on 7/28/2021 at 1:00 PM

## 2021-07-28 NOTE — ANESTHESIA POSTPROCEDURE EVALUATION
Patient: Sera Oshea    Procedure(s):  ESOPHAGOGASTRODUODENOSCOPY, WITH BIOPSY    Diagnosis:Muscle tension dysphonia [R49.0]  Vocal cord nodule [J38.2]  Tobacco dependence syndrome [F17.200]  Laryngopharyngeal reflux (LPR) [K21.9]  Tobacco abuse counseling [Z71.6]  Diagnosis Additional Information: No value filed.    Anesthesia Type:  General    Note:  Disposition: Outpatient   Postop Pain Control: Uneventful            Sign Out: Well controlled pain   PONV: No   Neuro/Psych: Uneventful            Sign Out: Acceptable/Baseline neuro status   Airway/Respiratory: Uneventful            Sign Out: Acceptable/Baseline resp. status   CV/Hemodynamics: Uneventful            Sign Out: Acceptable CV status; No obvious hypovolemia; No obvious fluid overload   Other NRE: NONE   DID A NON-ROUTINE EVENT OCCUR?            Last vitals:  Vitals Value Taken Time   BP     Temp     Pulse     Resp     SpO2         Electronically Signed By: NOEMÍ Bradley CRNA  July 28, 2021  1:20 PM

## 2021-07-28 NOTE — ANESTHESIA CARE TRANSFER NOTE
Patient: Sera Oshea    Procedure(s):  ESOPHAGOGASTRODUODENOSCOPY, WITH BIOPSY    Diagnosis: Muscle tension dysphonia [R49.0]  Vocal cord nodule [J38.2]  Tobacco dependence syndrome [F17.200]  Laryngopharyngeal reflux (LPR) [K21.9]  Tobacco abuse counseling [Z71.6]  Diagnosis Additional Information: No value filed.    Anesthesia Type:   General     Note:    Oropharynx: oropharynx clear of all foreign objects  Level of Consciousness: awake  Oxygen Supplementation: room air    Independent Airway: airway patency satisfactory and stable  Dentition: dentition unchanged  Vital Signs Stable: post-procedure vital signs reviewed and stable  Report to RN Given: handoff report given  Patient transferred to: Phase II    Handoff Report: Identifed the Patient, Identified the Reponsible Provider, Reviewed the pertinent medical history, Discussed the surgical course, Reviewed Intra-OP anesthesia mangement and issues during anesthesia, Set expectations for post-procedure period and Allowed opportunity for questions and acknowledgement of understanding      Vitals:  Vitals Value Taken Time   BP     Temp     Pulse     Resp     SpO2         Electronically Signed By: NOEMÍ Bradley CRNA  July 28, 2021  1:20 PM

## 2021-07-30 LAB
PATH REPORT.COMMENTS IMP SPEC: NORMAL
PATH REPORT.COMMENTS IMP SPEC: NORMAL
PATH REPORT.FINAL DX SPEC: NORMAL
PATH REPORT.GROSS SPEC: NORMAL
PATH REPORT.MICROSCOPIC SPEC OTHER STN: NORMAL
PATH REPORT.RELEVANT HX SPEC: NORMAL
PHOTO IMAGE: NORMAL

## 2021-07-30 PROCEDURE — 88305 TISSUE EXAM BY PATHOLOGIST: CPT | Mod: 26 | Performed by: PATHOLOGY

## 2021-08-24 NOTE — PROGRESS NOTES
Chief Complaint   Patient presents with     RECHECK     History of Present Illness  Sera Oshea is a 36 year old female who presents today for follow-up.  I evaluated the patient on 7/12/2021.  She had a long history of dysphonia.  Her endoscopic exam was consistent with laryngeal irritation, bilateral vocal fold nodules, and muscle tension dysphonia.  She was evaluated by speech on 7/14/2021.  She also underwent EGD on 7/28/2021.  Her EGD did suggest a small hiatal hernia.  She was previously placed on a proton pump inhibitor and dietary changes.  We also discussed the importance of tobacco cessation given her voice issues.  She returns today for follow-up.    Since last seeing the patient, the patient reports significant improvement in her regurgitation symptoms.  She feels like her swallowing has been a bit better.  She did meet with speech therapy and feels like the recommendations were helpful.  She feels like her voice is about the same.  She was able to quit smoking for about 10 days.  When she evaluated her tobacco use a bit more, she was having between 30 and 40 cigarettes/day, now she is smoking less than 20 a day.  She does roll her own cigarettes.  She denies any sore throat, odynophagia, pharyngodynia, otalgia, hemoptysis.  She sometimes will feel like she has some neck fullness or discomfort but no persistent lymphadenopathy or masses.  No unintentional weight loss.    Past Medical History  There is no problem list on file for this patient.    Current Medications    Current Outpatient Medications:      nicotine (NICODERM CQ) 14 MG/24HR 24 hr patch, Place 1 patch onto the skin every 24 hours, Disp: 30 patch, Rfl: 1     nicotine (NICOTROL) 10 MG inhaler, Use 1 cartridge as needed for urge to smoke by puffing over course of 20min.  Use 6-16 cart/day; reduce number of cart/day over 6-12 weeks., Disp: 168 each, Rfl: 1     pantoprazole (PROTONIX) 40 MG EC tablet, Take 1 tablet (40 mg) by mouth daily,  Disp: 90 tablet, Rfl: 1    Allergies  No Known Allergies    Social History  Social History     Socioeconomic History     Marital status:      Spouse name: Not on file     Number of children: Not on file     Years of education: Not on file     Highest education level: Not on file   Occupational History     Not on file   Tobacco Use     Smoking status: Current Some Day Smoker     Packs/day: 0.50     Types: Cigarettes     Smokeless tobacco: Never Used     Tobacco comment: in process of quitting   Substance and Sexual Activity     Alcohol use: Yes     Comment: occas     Drug use: Yes     Types: Marijuana     Sexual activity: Not on file   Other Topics Concern     Parent/sibling w/ CABG, MI or angioplasty before 65F 55M? Not Asked   Social History Narrative     Not on file     Social Determinants of Health     Financial Resource Strain:      Difficulty of Paying Living Expenses:    Food Insecurity:      Worried About Running Out of Food in the Last Year:      Ran Out of Food in the Last Year:    Transportation Needs:      Lack of Transportation (Medical):      Lack of Transportation (Non-Medical):    Physical Activity:      Days of Exercise per Week:      Minutes of Exercise per Session:    Stress:      Feeling of Stress :    Social Connections:      Frequency of Communication with Friends and Family:      Frequency of Social Gatherings with Friends and Family:      Attends Confucianism Services:      Active Member of Clubs or Organizations:      Attends Club or Organization Meetings:      Marital Status:    Intimate Partner Violence:      Fear of Current or Ex-Partner:      Emotionally Abused:      Physically Abused:      Sexually Abused:        Family History  Family History   Problem Relation Age of Onset     Emphysema Mother      Throat cancer Mother 63       Review of Systems  As per HPI and PMHx, otherwise 10 system review including the head and neck, constitutional, eyes, respiratory, GI, skin, neurologic,  "lymphatic, endocrine, and allergy systems is negative.    Physical Exam  /70 (BP Location: Right arm, Patient Position: Sitting, Cuff Size: Adult Large)   Pulse 93   Temp 98.5  F (36.9  C) (Tympanic)   Ht 1.549 m (5' 1\")   Wt 69.4 kg (153 lb)   BMI 28.91 kg/m    GENERAL: Patient is a pleasant, cooperative 36 year old female in no acute distress.  HEAD: Normocephalic, atraumatic.  Hair and scalp are normal.  EYES: Pupils are equal, round, reactive to light and accommodation.  Extraocular movements are intact.  The sclera nonicteric without injection.  The extraocular structures are normal.  EARS: Normal shape and symmetry.  No tenderness when palpating the mastoid or tragal areas bilaterally.  Otoscopic exam reveals a minimal amount of cerumen bilaterally.  The bilateral tympanic membranes are round, intact without evidence of effusion, good landmarks.  No retraction, granulation, or drainage.  NOSE: Nares are patent.  Nasal mucosa is pink and moist. Negative anterior rhinoscpy.  ORAL CAVITY: Dentition is in good repair.  Mucous membranes are moist.  Tongue is mobile, protrudes to the midline.  Palate elevates symmetrically.  Tonsils are 1+, symmetric.  No erythema or exudate.  No oral cavity or oropharyngeal masses, lesions, ulcerations, leukoplakia.  NECK: Supple, trachea is midline.  There no palpable cervical lymphadenopathy or masses bilaterally.  Palpation of the bilateral parotid and submandibular areas reveal no masses.  No thyromegaly.    NEUROLOGIC: Cranial nerves II through XII are grossly intact.  The patients voice is hoarse and raspy.  Patient is House-Brackmann I/VI bilaterally.  CARDIOVASCULAR: Extremities are warm and well-perfused.  No significant peripheral edema.  RESPIRATORY: Patient has nonlabored breathing without cough, wheeze, stridor.  PSYCHIATRIC: Patient is alert and oriented.  Mood and affect appear normal.  SKIN: Warm and dry.  No scalp, face, or neck lesions " noted.    Assessment and Plan     ICD-10-CM    1. Muscle tension dysphonia  R49.0 nicotine (NICODERM CQ) 14 MG/24HR 24 hr patch     nicotine (NICOTROL) 10 MG inhaler   2. Vocal cord nodules  J38.2 nicotine (NICODERM CQ) 14 MG/24HR 24 hr patch     nicotine (NICOTROL) 10 MG inhaler   3. Tobacco dependence syndrome  F17.200 nicotine (NICODERM CQ) 14 MG/24HR 24 hr patch     nicotine (NICOTROL) 10 MG inhaler   4. Encounter for tobacco use cessation counseling  Z71.6 nicotine (NICODERM CQ) 14 MG/24HR 24 hr patch     nicotine (NICOTROL) 10 MG inhaler   5. Laryngopharyngeal reflux  K21.9 nicotine (NICODERM CQ) 14 MG/24HR 24 hr patch     nicotine (NICOTROL) 10 MG inhaler   6. Gastroesophageal reflux disease, unspecified whether esophagitis present  K21.9 pantoprazole (PROTONIX) 40 MG EC tablet      It was my pleasure seeing Sera Oshea today in clinic.  At this point in time, I think the patient would really benefit from completing tobacco cessation.  I provided her with another prescription for the Nicotrol inhaler and the nicotine patches.  She can continue to speech therapy.  I gave her another prescription for the pantoprazole as this is likely contributing.  If she is not improving after tobacco cessation, I could have her see one of our laryngology colleagues down to the [facility].  Otherwise, we will keep treating her reflux, keep working with speech therapy, and keep working on tobacco cessation.  The patient knows to contact me with any problems or concerns.    Jaden Atkins MD  Department of Otolarygology-Head and Neck Surgery  [facility]

## 2021-08-25 ENCOUNTER — OFFICE VISIT (OUTPATIENT)
Dept: OTOLARYNGOLOGY | Facility: CLINIC | Age: 36
End: 2021-08-25
Payer: COMMERCIAL

## 2021-08-25 VITALS
BODY MASS INDEX: 28.89 KG/M2 | SYSTOLIC BLOOD PRESSURE: 117 MMHG | HEIGHT: 61 IN | TEMPERATURE: 98.5 F | WEIGHT: 153 LBS | HEART RATE: 93 BPM | DIASTOLIC BLOOD PRESSURE: 70 MMHG

## 2021-08-25 DIAGNOSIS — Z71.6 ENCOUNTER FOR TOBACCO USE CESSATION COUNSELING: ICD-10-CM

## 2021-08-25 DIAGNOSIS — J38.2 VOCAL CORD NODULES: ICD-10-CM

## 2021-08-25 DIAGNOSIS — K21.9 LARYNGOPHARYNGEAL REFLUX: ICD-10-CM

## 2021-08-25 DIAGNOSIS — F17.200 TOBACCO DEPENDENCE SYNDROME: ICD-10-CM

## 2021-08-25 DIAGNOSIS — K21.9 GASTROESOPHAGEAL REFLUX DISEASE, UNSPECIFIED WHETHER ESOPHAGITIS PRESENT: ICD-10-CM

## 2021-08-25 DIAGNOSIS — R49.0 MUSCLE TENSION DYSPHONIA: ICD-10-CM

## 2021-08-25 PROCEDURE — 99213 OFFICE O/P EST LOW 20 MIN: CPT | Performed by: OTOLARYNGOLOGY

## 2021-08-25 RX ORDER — NICOTINE 21 MG/24HR
1 PATCH, TRANSDERMAL 24 HOURS TRANSDERMAL EVERY 24 HOURS
Qty: 30 PATCH | Refills: 1 | Status: SHIPPED | OUTPATIENT
Start: 2021-08-25

## 2021-08-25 RX ORDER — PANTOPRAZOLE SODIUM 40 MG/1
40 TABLET, DELAYED RELEASE ORAL DAILY
Qty: 90 TABLET | Refills: 1 | Status: SHIPPED | OUTPATIENT
Start: 2021-08-25

## 2021-08-25 ASSESSMENT — MIFFLIN-ST. JEOR: SCORE: 1321.38

## 2021-08-25 NOTE — LETTER
8/25/2021         RE: Sera Oshea  [address] [facility] [address]        Dear Colleague,    Thank you for referring your patient, Sera Oshea, to the [facility]. Please see a copy of my visit note below.    Chief Complaint   Patient presents with     RECHECK     History of Present Illness  Sera Oshea is a 36 year old female who presents today for follow-up.  I evaluated the patient on 7/12/2021.  She had a long history of dysphonia.  Her endoscopic exam was consistent with laryngeal irritation, bilateral vocal fold nodules, and muscle tension dysphonia.  She was evaluated by speech on 7/14/2021.  She also underwent EGD on 7/28/2021.  Her EGD did suggest a small hiatal hernia.  She was previously placed on a proton pump inhibitor and dietary changes.  We also discussed the importance of tobacco cessation given her voice issues.  She returns today for follow-up.    Since last seeing the patient, the patient reports significant improvement in her regurgitation symptoms.  She feels like her swallowing has been a bit better.  She did meet with speech therapy and feels like the recommendations were helpful.  She feels like her voice is about the same.  She was able to quit smoking for about 10 days.  When she evaluated her tobacco use a bit more, she was having between 30 and 40 cigarettes/day, now she is smoking less than 20 a day.  She does roll her own cigarettes.  She denies any sore throat, odynophagia, pharyngodynia, otalgia, hemoptysis.  She sometimes will feel like she has some neck fullness or discomfort but no persistent lymphadenopathy or masses.  No unintentional weight loss.    Past Medical History  There is no problem list on file for this patient.    Current Medications    Current Outpatient Medications:      nicotine (NICODERM CQ) 14 MG/24HR 24 hr patch, Place 1 patch onto the skin every 24 hours, Disp: 30 patch, Rfl: 1     nicotine (NICOTROL) 10 MG  inhaler, Use 1 cartridge as needed for urge to smoke by puffing over course of 20min.  Use 6-16 cart/day; reduce number of cart/day over 6-12 weeks., Disp: 168 each, Rfl: 1     pantoprazole (PROTONIX) 40 MG EC tablet, Take 1 tablet (40 mg) by mouth daily, Disp: 90 tablet, Rfl: 1    Allergies  No Known Allergies    Social History  Social History     Socioeconomic History     Marital status:      Spouse name: Not on file     Number of children: Not on file     Years of education: Not on file     Highest education level: Not on file   Occupational History     Not on file   Tobacco Use     Smoking status: Current Some Day Smoker     Packs/day: 0.50     Types: Cigarettes     Smokeless tobacco: Never Used     Tobacco comment: in process of quitting   Substance and Sexual Activity     Alcohol use: Yes     Comment: occas     Drug use: Yes     Types: Marijuana     Sexual activity: Not on file   Other Topics Concern     Parent/sibling w/ CABG, MI or angioplasty before 65F 55M? Not Asked   Social History Narrative     Not on file     Social Determinants of Health     Financial Resource Strain:      Difficulty of Paying Living Expenses:    Food Insecurity:      Worried About Running Out of Food in the Last Year:      Ran Out of Food in the Last Year:    Transportation Needs:      Lack of Transportation (Medical):      Lack of Transportation (Non-Medical):    Physical Activity:      Days of Exercise per Week:      Minutes of Exercise per Session:    Stress:      Feeling of Stress :    Social Connections:      Frequency of Communication with Friends and Family:      Frequency of Social Gatherings with Friends and Family:      Attends [employer] Services:      Active Member of Clubs or Organizations:      Attends Club or Organization Meetings:      Marital Status:    Intimate Partner Violence:      Fear of Current or Ex-Partner:      Emotionally Abused:      Physically Abused:      Sexually Abused:        Family  "History  Family History   Problem Relation Age of Onset     Emphysema Mother      Throat cancer Mother 63       Review of Systems  As per HPI and PMHx, otherwise 10 system review including the head and neck, constitutional, eyes, respiratory, GI, skin, neurologic, lymphatic, endocrine, and allergy systems is negative.    Physical Exam  /70 (BP Location: Right arm, Patient Position: Sitting, Cuff Size: Adult Large)   Pulse 93   Temp 98.5  F (36.9  C) (Tympanic)   Ht 1.549 m (5' 1\")   Wt 69.4 kg (153 lb)   BMI 28.91 kg/m    GENERAL: Patient is a pleasant, cooperative 36 year old female in no acute distress.  HEAD: Normocephalic, atraumatic.  Hair and scalp are normal.  EYES: Pupils are equal, round, reactive to light and accommodation.  Extraocular movements are intact.  The sclera nonicteric without injection.  The extraocular structures are normal.  EARS: Normal shape and symmetry.  No tenderness when palpating the mastoid or tragal areas bilaterally.  Otoscopic exam reveals a minimal amount of cerumen bilaterally.  The bilateral tympanic membranes are round, intact without evidence of effusion, good landmarks.  No retraction, granulation, or drainage.  NOSE: Nares are patent.  Nasal mucosa is pink and moist. Negative anterior rhinoscpy.  ORAL CAVITY: Dentition is in good repair.  Mucous membranes are moist.  Tongue is mobile, protrudes to the midline.  Palate elevates symmetrically.  Tonsils are 1+, symmetric.  No erythema or exudate.  No oral cavity or oropharyngeal masses, lesions, ulcerations, leukoplakia.  NECK: Supple, trachea is midline.  There no palpable cervical lymphadenopathy or masses bilaterally.  Palpation of the bilateral parotid and submandibular areas reveal no masses.  No thyromegaly.    NEUROLOGIC: Cranial nerves II through XII are grossly intact.  The patients voice is hoarse and raspy.  Patient is House-Brackmann I/VI bilaterally.  CARDIOVASCULAR: Extremities are warm and " well-perfused.  No significant peripheral edema.  RESPIRATORY: Patient has nonlabored breathing without cough, wheeze, stridor.  PSYCHIATRIC: Patient is alert and oriented.  Mood and affect appear normal.  SKIN: Warm and dry.  No scalp, face, or neck lesions noted.    Assessment and Plan     ICD-10-CM    1. Muscle tension dysphonia  R49.0 nicotine (NICODERM CQ) 14 MG/24HR 24 hr patch     nicotine (NICOTROL) 10 MG inhaler   2. Vocal cord nodules  J38.2 nicotine (NICODERM CQ) 14 MG/24HR 24 hr patch     nicotine (NICOTROL) 10 MG inhaler   3. Tobacco dependence syndrome  F17.200 nicotine (NICODERM CQ) 14 MG/24HR 24 hr patch     nicotine (NICOTROL) 10 MG inhaler   4. Encounter for tobacco use cessation counseling  Z71.6 nicotine (NICODERM CQ) 14 MG/24HR 24 hr patch     nicotine (NICOTROL) 10 MG inhaler   5. Laryngopharyngeal reflux  K21.9 nicotine (NICODERM CQ) 14 MG/24HR 24 hr patch     nicotine (NICOTROL) 10 MG inhaler   6. Gastroesophageal reflux disease, unspecified whether esophagitis present  K21.9 pantoprazole (PROTONIX) 40 MG EC tablet      It was my pleasure seeing Sera Oshea today in clinic.  At this point in time, I think the patient would really benefit from completing tobacco cessation.  I provided her with another prescription for the Nicotrol inhaler and the nicotine patches.  She can continue to speech therapy.  I gave her another prescription for the pantoprazole as this is likely contributing.  If she is not improving after tobacco cessation, I could have her see one of our laryngology colleagues down to the [facility].  Otherwise, we will keep treating her reflux, keep working with speech therapy, and keep working on tobacco cessation.  The patient knows to contact me with any problems or concerns.    Jaden Atkins MD  Department of Otolarygology-Head and Neck Surgery  [facility]         Again, thank you for allowing me to participate in the care of your patient.         Sincerely,        Jaden Atkins MD

## 2021-08-25 NOTE — NURSING NOTE
"Initial /70 (BP Location: Right arm, Patient Position: Sitting, Cuff Size: Adult Large)   Pulse 93   Temp 98.5  F (36.9  C) (Tympanic)   Ht 1.549 m (5' 1\")   Wt 69.4 kg (153 lb)   BMI 28.91 kg/m   Estimated body mass index is 28.91 kg/m  as calculated from the following:    Height as of this encounter: 1.549 m (5' 1\").    Weight as of this encounter: 69.4 kg (153 lb). .      Gaye Chatterjee LPN on 8/25/2021 at 12:54 PM    "

## (undated) RX ORDER — LIDOCAINE HYDROCHLORIDE 10 MG/ML
INJECTION, SOLUTION EPIDURAL; INFILTRATION; INTRACAUDAL; PERINEURAL
Status: DISPENSED
Start: 2021-07-28

## (undated) RX ORDER — PROPOFOL 10 MG/ML
INJECTION, EMULSION INTRAVENOUS
Status: DISPENSED
Start: 2021-07-28